# Patient Record
Sex: FEMALE
[De-identification: names, ages, dates, MRNs, and addresses within clinical notes are randomized per-mention and may not be internally consistent; named-entity substitution may affect disease eponyms.]

---

## 2022-11-16 ENCOUNTER — HOME CARE VISIT (OUTPATIENT)
Dept: SCHEDULING | Facility: HOME HEALTH | Age: 87
End: 2022-11-16

## 2022-11-16 ENCOUNTER — HOME HEALTH ADMISSION (OUTPATIENT)
Dept: HOME HEALTH SERVICES | Facility: HOME HEALTH | Age: 87
End: 2022-11-16
Payer: MEDICARE

## 2022-11-16 VITALS
SYSTOLIC BLOOD PRESSURE: 130 MMHG | OXYGEN SATURATION: 100 % | DIASTOLIC BLOOD PRESSURE: 80 MMHG | TEMPERATURE: 97.8 F | HEART RATE: 97 BPM | RESPIRATION RATE: 18 BRPM

## 2022-11-16 PROCEDURE — 0221000100 HH NO PAY CLAIM PROCEDURE

## 2022-11-16 PROCEDURE — G0299 HHS/HOSPICE OF RN EA 15 MIN: HCPCS

## 2022-11-16 ASSESSMENT — ENCOUNTER SYMPTOMS: DYSPNEA ACTIVITY LEVEL: AFTER AMBULATING 10 - 20 FT

## 2022-11-17 ENCOUNTER — HOME CARE VISIT (OUTPATIENT)
Dept: SCHEDULING | Facility: HOME HEALTH | Age: 87
End: 2022-11-17

## 2022-11-17 VITALS
SYSTOLIC BLOOD PRESSURE: 130 MMHG | OXYGEN SATURATION: 97 % | DIASTOLIC BLOOD PRESSURE: 80 MMHG | HEART RATE: 81 BPM | TEMPERATURE: 97.5 F | RESPIRATION RATE: 18 BRPM

## 2022-11-17 PROCEDURE — G0152 HHCP-SERV OF OT,EA 15 MIN: HCPCS

## 2022-11-17 PROCEDURE — G0151 HHCP-SERV OF PT,EA 15 MIN: HCPCS

## 2022-11-17 ASSESSMENT — ENCOUNTER SYMPTOMS: PAIN LOCATION - PAIN QUALITY: ACHE

## 2022-11-17 NOTE — HOME HEALTH
Pt is an 81 yo RHD female referred to PT with a history of falls. Pt lives in [address] and her room is filled with moving boxes. Pt states she had a UTI and was admitted to the hospital. After staying in rehab, she has returned home. She feels she is prescribed too many medicaitons. PMH: B SHERLEY, arthritis, falls. Pt presents with forward head, forward lumbar flexion with gait. Pt demonstrates BLE muscle weakness, impaired dynamic balance, limited standing tolerance and decreased endurance causing difficulty performing safe functional transfers from various surfaces throughout home, standing activities and bed mobility without assistance as able to do in PLOF. TUG is 25 which indicates a high fall risk. Pt is homebound due to being at high risk for falls and requires the assistance of another person to leave home safely. .    Patient will benefit from PT for strength, balance, transfer, gait training, Pt/CG education on fall prevention and HEP.

## 2022-11-17 NOTE — HOME HEALTH
This patient has answered NO to the following questions:   1) have you traveled outside the country   2) have you been exposed to or been in contact with anyone whom traveled outside the country in the past two weeks   3) do you have a sore throat/fever/dry cough      4)The patient has been educated on and demonstrates good understanding via the teach-back method for the following: Signs and symptoms of COVID-19 yes    Pt is an 80year old female who recently moved to detention. Pt referred for therapy after several falls, pt followed with rehab and returned to new detention . Pt previously had just moved to other detention recently which didn't work out. Pt has demonstrated decline in ADL's, transfers and was recently restricted to [facility] when entering rehab due to several falls. Pt ambulates in room and to dining room currently with walker with min assist for safety, VC for safety when turning for transfers and hand placement on surfaces. Pt requires min assist for ADL's with decreased standing balance/tolerance requiring UE support at all times and frequent rest periods. Pt demo's decreased UE strength for transfers and making sit to stand difficult from low chair. DME- shower chair, bars, raised toilet seat with arms, 4ww, standing walker. Pt is forgetful of recent events and of schedule/places/timing of events in new detention. Discussed DC plan  with pt and caregiver, plan to DC when goals met   Pt would benefit with OT to increase safety and indep with ADL's, increase bilat UE strength for functional transfers, adaptive equipment recommendations and training, increasing standing tolerance/balance for safety with ADL's with fewer rest periods, assist with orienting to new facility with prevention of falls.    Pt's goal is to walk more and be more indep

## 2022-11-18 VITALS
HEART RATE: 62 BPM | TEMPERATURE: 97.3 F | SYSTOLIC BLOOD PRESSURE: 132 MMHG | OXYGEN SATURATION: 99 % | DIASTOLIC BLOOD PRESSURE: 76 MMHG | RESPIRATION RATE: 18 BRPM

## 2022-11-21 ENCOUNTER — HOME CARE VISIT (OUTPATIENT)
Dept: SCHEDULING | Facility: HOME HEALTH | Age: 87
End: 2022-11-21

## 2022-11-21 ENCOUNTER — HOME CARE VISIT (OUTPATIENT)
Dept: HOME HEALTH SERVICES | Facility: HOME HEALTH | Age: 87
End: 2022-11-21

## 2022-11-21 PROCEDURE — G0157 HHC PT ASSISTANT EA 15: HCPCS

## 2022-11-22 ENCOUNTER — HOME CARE VISIT (OUTPATIENT)
Dept: SCHEDULING | Facility: HOME HEALTH | Age: 87
End: 2022-11-22

## 2022-11-22 VITALS
TEMPERATURE: 97.5 F | OXYGEN SATURATION: 100 % | DIASTOLIC BLOOD PRESSURE: 58 MMHG | HEART RATE: 58 BPM | SYSTOLIC BLOOD PRESSURE: 122 MMHG

## 2022-11-22 PROCEDURE — G0158 HHC OT ASSISTANT EA 15: HCPCS

## 2022-11-23 ENCOUNTER — HOME CARE VISIT (OUTPATIENT)
Dept: SCHEDULING | Facility: HOME HEALTH | Age: 87
End: 2022-11-23

## 2022-11-23 VITALS
HEART RATE: 60 BPM | DIASTOLIC BLOOD PRESSURE: 66 MMHG | SYSTOLIC BLOOD PRESSURE: 118 MMHG | OXYGEN SATURATION: 96 % | TEMPERATURE: 98 F | RESPIRATION RATE: 18 BRPM

## 2022-11-23 PROCEDURE — G0300 HHS/HOSPICE OF LPN EA 15 MIN: HCPCS

## 2022-11-23 PROCEDURE — G0157 HHC PT ASSISTANT EA 15: HCPCS

## 2022-11-23 ASSESSMENT — ENCOUNTER SYMPTOMS: HEMOPTYSIS: 0

## 2022-11-23 NOTE — HOME HEALTH
Pt agreeable to Tx . Seated in rocker in dark quiet room upon HOWE arrival and then again after Tx. Pt amb in room with 2 ww slowly , no loss of balance. Seated unsupported on director chair to complete (B) UE AROM , neck AROM , and dynaic sitting exercises . Provided wtih hand out to foolow along with exercises. NO c/o pain with exercises or transfers. Sit to stand difficult from appolstered chair, but with addition of throw pillow in seat, pt was able to easily transition sit to stand. Pt used good body mechanics without cues, Pt stated she had recieved a lot of therapy in the past and had learned the body mechanics. Pt slow ,moving and requiring time to process directions. .Provided with calendar and encouraged use to keep track of days and appointments. Encouraged hydration with infused water and juice provided during Tx. Pt stated she hates water , but could get used to infused water. Pt will advance toward goals and dc when goals met.

## 2022-11-24 NOTE — HOME HEALTH
SN assessment, vss, condition stable, respirations even and unlabored, lcta, no sob, no edema, good appetite and hydration, pt denies pain, no urinary discomfort reported, sn instructed pt on safety precautions, proper nutrition and hydration, take medications as prescribed, pt voiced understanding using the teach back method.

## 2022-11-25 ENCOUNTER — HOME CARE VISIT (OUTPATIENT)
Dept: SCHEDULING | Facility: HOME HEALTH | Age: 87
End: 2022-11-25

## 2022-11-25 VITALS
HEART RATE: 69 BPM | DIASTOLIC BLOOD PRESSURE: 70 MMHG | TEMPERATURE: 97.9 F | SYSTOLIC BLOOD PRESSURE: 142 MMHG | OXYGEN SATURATION: 97 %

## 2022-11-25 PROCEDURE — G0158 HHC OT ASSISTANT EA 15: HCPCS

## 2022-11-25 NOTE — HOME HEALTH
Pt agreeable to Tx with ADL . Seated on side of bed awaiting care giver to A with shower . Pt assited with set up of shower and hiar washing , but completed all bahting indep / mod I follwing set up , additional time required as Pt is slow moving and cautious. Transfers in and out of shower with use of rails and cues for hand plnacement . Seated on bed for indep dressisng with cues to advance to next task and to don undergarmernt properly . Pt stated she was unable to see line indicating back. Min A to don shoes /sandals. due to fatigue . Pt amb in room with walker to recliner and rested after ADL . Declined oral care at sink at this time . Pt will advance toward goals and d.c when met . Pt cooperative and pleasant . slow to process direction .

## 2022-11-27 VITALS — SYSTOLIC BLOOD PRESSURE: 132 MMHG | DIASTOLIC BLOOD PRESSURE: 78 MMHG

## 2022-11-27 VITALS — SYSTOLIC BLOOD PRESSURE: 120 MMHG | HEART RATE: 66 BPM | DIASTOLIC BLOOD PRESSURE: 68 MMHG

## 2022-11-27 NOTE — HOME HEALTH
Pt was walking towards her door with her RW when therpist arrived. Asked pt where she was gong and she said she had to go to dinner. Reminded her that dinner is at 5:30 and that she didnt have to go down this early. Pt walked back into her room and had her sit in her chair. She said she is still getting used to schedule and seems slighty confused when talking about her time at rehab and recent return. Pt reports having frequent urination and was inquired about being tested again for UTI. Spoke to [facility] nurse who will look to see if there is an order for UA. Initiated LE strength tx seated per HEP: ankle pumps,. LAQ, hip flex, resisted hip abd green tband , ham curls green tband 2x10 with instruction in proper form and pace. Sit tp stand from chair to RW; pt able to push off from arnrest nand keep wt forward to stand. Work on posture re-ed in standing , head turns and wt shjftimg. Gait with RW and SBA from living room to bathroom ; with cues on slow turn to sit on toilet. Discussed importance of using bathroom regularly to make sure she is clean and dry and to change pullup when wet as she is prone to UTIs. Also discussed proper hygiene. Walked back to her living room with RW and had her turn to sit in chair,. She has a 4ww in her room but has been instructed to continue using her RW until her safety, strength andn balance improves. After short rest contnued gait with RW and SBA from her room into hallway and down to dining room for dinner. Cues to correct posture and stay closer to walker , focus on step clearance. Education on importance of hydration, using walker at all times , elevate legs when in room and use bathroom regular to stay clean and dry.  With compliance to therapy and HEP she is expected to make gains

## 2022-11-28 ENCOUNTER — HOME CARE VISIT (OUTPATIENT)
Dept: SCHEDULING | Facility: HOME HEALTH | Age: 87
End: 2022-11-28

## 2022-11-28 VITALS
TEMPERATURE: 98.1 F | DIASTOLIC BLOOD PRESSURE: 82 MMHG | SYSTOLIC BLOOD PRESSURE: 122 MMHG | OXYGEN SATURATION: 98 % | HEART RATE: 69 BPM

## 2022-11-28 PROCEDURE — G0158 HHC OT ASSISTANT EA 15: HCPCS

## 2022-11-28 NOTE — HOME HEALTH
Pt was sitting on edge of her bed when therapist arrived. She seemed slightly confused about what she should be doing. Her aide came in to [facility] on her and encouraged her to not go back to bed. Had her walk out to her living room with her RW and sit in her chair. Worked on LE strength tx seated per HEP: ankle pumps,LAQ, hip flex, resisted hip abd and ham curls green tband 2x15. Pt required more cues today to stay on task and maintain form and was slow to react. Discussed returning to activities that she did before going to rehab such as sittercise. Reminded her that there is a calendar with activities and times and that she can have her aides walk with her. Sit to stand from low chair to RW with SBA. In standing worked on standing balance act with BUE assist. Gait with RW and SBA around her room, in and out of bathroom and turning to change direction. Cues on posture and staying closer to walker and on slow turn in front of chair. Moved some furniture over for her and instructed her to keep open path and floor free of clutter. She declined walkng outside of her room and wanted to rest. Had her sit in chair with legs elevated on ottoman which she is able to move herself. Also brought her a glass of water, reminding her importance of staying hydrated. Pt needs encouragement and reminders to drink water. Spoke with her aide after visit about her confusion. She said that she is usually better as the day goes on but is usually tired and more confused in mornings. She will check on patient and remnd her to drink her water. With compliance  to therapy and HEP she is expected to make gains  The patient has answered yes or no to the following questions:  Have you traveled outside of the country? NO  Have you been exposed to or been in conact with someone who traveled outside of the country inthe past two weeks? NO Do you have a fever, sore throat or dry cough?  No   The patient has been educated on nd demonstrates good understanding via the teach back method for the following: Signs and symptoms of Covid 19? YES  Patient instucted to alert nurse to any of these symptoms or any other changes.  Instructed to stay hydrated, proper handwashing which was instructed , social distance

## 2022-11-28 NOTE — HOME HEALTH
Pt agreeable toTx No reproted changes or incidents . Pt seated side of bed ,Unsupported to completed (B) UE Aroma nd dynamic sitting . Seated and standing in living room for resistance band exercises completed in multiple sets of 10 in 2 planes with no c/op pain . Pt transfers indep from bed and chair. used 2 ww consistnatly in room . Set up with clock on wall in kitchen for keeping track of time for meals and acitivities. Pt declined amb to activity room this morning , to reinforce where to go this afternoon. Reiforced use of [employer] acitity calendar and appointment calendar . Pt stated she remembered HOWE was coming this morning , however , she was in bed , so it is questionoable . Pt slow to process cues and slow to reply. Pepper monte from [facility]  WIll address with team . Pt will advance toward goals and d/c wehne met. Pt declined any ADL this tx.

## 2022-11-29 ENCOUNTER — HOME CARE VISIT (OUTPATIENT)
Dept: SCHEDULING | Facility: HOME HEALTH | Age: 87
End: 2022-11-29
Payer: MEDICARE

## 2022-11-29 PROCEDURE — G0157 HHC PT ASSISTANT EA 15: HCPCS

## 2022-11-30 ENCOUNTER — HOME CARE VISIT (OUTPATIENT)
Dept: SCHEDULING | Facility: HOME HEALTH | Age: 87
End: 2022-11-30

## 2022-11-30 VITALS
TEMPERATURE: 98.8 F | OXYGEN SATURATION: 96 % | SYSTOLIC BLOOD PRESSURE: 130 MMHG | HEART RATE: 70 BPM | DIASTOLIC BLOOD PRESSURE: 80 MMHG

## 2022-11-30 VITALS
SYSTOLIC BLOOD PRESSURE: 118 MMHG | HEART RATE: 68 BPM | DIASTOLIC BLOOD PRESSURE: 66 MMHG | TEMPERATURE: 97.8 F | OXYGEN SATURATION: 96 % | RESPIRATION RATE: 18 BRPM

## 2022-11-30 PROCEDURE — G0158 HHC OT ASSISTANT EA 15: HCPCS

## 2022-11-30 PROCEDURE — G0300 HHS/HOSPICE OF LPN EA 15 MIN: HCPCS

## 2022-11-30 ASSESSMENT — ENCOUNTER SYMPTOMS
HEMOPTYSIS: 0
PAIN LOCATION - PAIN QUALITY: ACHE

## 2022-11-30 NOTE — HOME HEALTH
Pt in bed asleep upon HOWE arrival . Pt c/o bad head ache and running nose. Pt also reported feeling symptoms of UTI . Pt agreeabla to sit up for vital signs to me taken . Med tech notified of slighlty elevated temp , and symptoms . Pt requesting Tylenol . Med tech awaiting orders. Pt amb to bathroom slowly with 2 ww for toielting with min A to remove and jean undergarment. Incontinence of uring in undergarment and on the way to toilet , on floor. Pt asssited with hygiene for thorough cleansing. Pt then returned to bed , declined any further ADL or exercises. Pt completed supine to sit , sit to stand ,and sit to supine indep . Pt will advance toward goals and Dc when met. Pt provided [facility] new calendar for new month and 2023 calendar as well . However Pt did not use calendar today to recall OT visit.

## 2022-12-01 NOTE — HOME HEALTH
SN assessment, vss, condition stable, pt is alert and oriented x3, forgetful, pleasant and cooperative, good appetite and hydration, pt started Macrobid 100mg q 12h x5d on 11/29/22, no s/e reported, pt denies pain, no urinary discomfort at this time, sn instructed pt/cg to increase fluid intake, including cranberry juice or drinks fortified with Vit C to acidify the urine, report discomfort when urinating, chills, flank pain, pt/cg voiced understanding using the teach back method.

## 2022-12-05 ENCOUNTER — HOME CARE VISIT (OUTPATIENT)
Dept: SCHEDULING | Facility: HOME HEALTH | Age: 87
End: 2022-12-05
Payer: MEDICARE

## 2022-12-05 VITALS — DIASTOLIC BLOOD PRESSURE: 70 MMHG | SYSTOLIC BLOOD PRESSURE: 128 MMHG | HEART RATE: 66 BPM

## 2022-12-05 VITALS
TEMPERATURE: 98.1 F | OXYGEN SATURATION: 98 % | SYSTOLIC BLOOD PRESSURE: 90 MMHG | HEART RATE: 72 BPM | DIASTOLIC BLOOD PRESSURE: 60 MMHG

## 2022-12-05 PROCEDURE — G0158 HHC OT ASSISTANT EA 15: HCPCS

## 2022-12-05 NOTE — HOME HEALTH
Pt Positive for COVID . Sitting in recliner in room , Declined exercises , but agreeabl to try amb in room for exercise. Pt has low bp and light headed with stand  with walker , choosing to sit back down . Educated on need to keep hydrated and keep moving for strenagthening. Pt agreeable to water and juice. Provided along with ice for use with both , and soda. Pt did not check calendar today , so was not \"prepared \" for OT . REinforced use of appointment calandat , at the least , at this time , as faclility acitities of limits due to isolation . Pt will remain safe and progress toward goals .  Dc when goals met

## 2022-12-06 ENCOUNTER — HOME CARE VISIT (OUTPATIENT)
Dept: SCHEDULING | Facility: HOME HEALTH | Age: 87
End: 2022-12-06
Payer: MEDICARE

## 2022-12-06 PROCEDURE — G0157 HHC PT ASSISTANT EA 15: HCPCS

## 2022-12-06 NOTE — HOME HEALTH
Pt was sittting up in her chair in her living room when therapist arrived,, She is pleasant and willing to participate. Worked on LE strength tx seated per HEP with cueing on proper form and pace. Pt is slightly distracted and required cue to stay on task and maintain form. Her answers were slow and she seemed confused at times. Had pt drink some water while seated and reminded her of impoirtance of staying hydrated. She has been treated for recurring UTI; . Sit to stand fro chait to RW [facility] for safety; She rememebered to push off from armrest of chair and keep wt forward. Worked on standing balannce act at [employer] with BUE assist: perturbations. postre re-ed with head turns, wt shifting. Gait tx with RW and SBA from living room to bathroom. Observed pt walk in and start to turn then back up to toilet. She turned towards sink and thrn backed up to sit. Once in sitting showed her how she could walk in and towards shower, then turn slow and back up to sit on toilet and not have to take several steps back to sit. Explained that this way would be safer and she said she would try to remember. Had pt use bathroon before walking down to lunch and to check to see if she was dry which she was. OInce she was done. stood and then walked to her front door and out in hallway. Continued walk with 4ww and SBA from room to dining rooom where she stayed for lunch. Cues along the way to correct posture and focus on step clearamce.  She is slowly making gains and wit compliance to therapy and HEP is expected to make furher gains

## 2022-12-07 ENCOUNTER — HOME CARE VISIT (OUTPATIENT)
Dept: SCHEDULING | Facility: HOME HEALTH | Age: 87
End: 2022-12-07
Payer: MEDICARE

## 2022-12-07 VITALS
RESPIRATION RATE: 18 BRPM | OXYGEN SATURATION: 98 % | HEART RATE: 72 BPM | SYSTOLIC BLOOD PRESSURE: 120 MMHG | DIASTOLIC BLOOD PRESSURE: 62 MMHG | TEMPERATURE: 97.7 F

## 2022-12-07 PROCEDURE — G0299 HHS/HOSPICE OF RN EA 15 MIN: HCPCS

## 2022-12-07 ASSESSMENT — ENCOUNTER SYMPTOMS: DYSPNEA ACTIVITY LEVEL: AFTER AMBULATING MORE THAN 20 FT

## 2022-12-08 ENCOUNTER — HOME CARE VISIT (OUTPATIENT)
Dept: HOME HEALTH SERVICES | Facility: HOME HEALTH | Age: 87
End: 2022-12-08
Payer: MEDICARE

## 2022-12-08 ENCOUNTER — HOME CARE VISIT (OUTPATIENT)
Dept: SCHEDULING | Facility: HOME HEALTH | Age: 87
End: 2022-12-08
Payer: MEDICARE

## 2022-12-08 PROCEDURE — G0157 HHC PT ASSISTANT EA 15: HCPCS

## 2022-12-08 NOTE — HOME HEALTH
Patient is AOx3, forgetful at times, pleasant, in no apparent distress upon arrival and agreeable to sn discipline home visit. Referral received for wound care to RUE and BLE. All wounds are healed and skin is intact. Patient states appetite is good and hydration is adequate. Patient denies having any pain. Medications reviewed. Home safety check completed. Patient denies having any paranoia or hallucinations at this time, concentration is appropriate. Patient benefited from [facility] services for disease process and management, med management, infection control, home safety, education. Patient is homebound due to weakness, impaired functional mobility, risk for fall, risk for infection, patient requires use of assistive device and patient requires assistance of another person to leave home safely. SN educated patient on disease process and management, home safety, using assistive device at all times, fall precautions, s/s of infection, taking medications as prescribed, call me first procedure, s/s to report to nurse, physician, and s/s that necessitate calling emergency personnel. Patient repeated back and verbalized understanding, all questions answered. Pts wounds hea;ed. Pt will continue with therapy. SN discipline from [facility] today. SN instructed pt to follow up with MD for any issues and pt verbalized understanding. Pt tested positive for covid 19 this week and denies any symptoms except fatigue. SN instructed pt to stay home and separate from others as much as possible, use separate bathroom, don't share personal household items like cups, towels, utensils, get plenty of rest, stay hydrated and eat well and take all medications as prescribed.

## 2022-12-11 VITALS — HEART RATE: 66 BPM | SYSTOLIC BLOOD PRESSURE: 112 MMHG | DIASTOLIC BLOOD PRESSURE: 70 MMHG

## 2022-12-12 ENCOUNTER — HOME CARE VISIT (OUTPATIENT)
Dept: SCHEDULING | Facility: HOME HEALTH | Age: 87
End: 2022-12-12
Payer: MEDICARE

## 2022-12-12 VITALS
SYSTOLIC BLOOD PRESSURE: 115 MMHG | TEMPERATURE: 98.5 F | OXYGEN SATURATION: 98 % | DIASTOLIC BLOOD PRESSURE: 82 MMHG | HEART RATE: 66 BPM

## 2022-12-12 PROCEDURE — G0151 HHCP-SERV OF PT,EA 15 MIN: HCPCS

## 2022-12-12 PROCEDURE — G0158 HHC OT ASSISTANT EA 15: HCPCS

## 2022-12-12 ASSESSMENT — ENCOUNTER SYMPTOMS: PAIN LOCATION - PAIN QUALITY: ACHE

## 2022-12-12 NOTE — HOME HEALTH
Pt agreeable to Tx JUst completed Tx with PT. No med  changes reproted by med tech, no incidents. Pt reprots feeling better , having had no diarrhea episodes today. Declined ADL's stating , that she did what she felt like already and since in [place] , she didn't feel anything more was required. Agreeable to UE exercise but then reported (L) neck and shoulder pain with AROM . TOlerated all exercises in reps of 10 though. Instructed in pacing during Ther ex and use of proper body mechanics for effective strengthening . Resistnace with yellow band x 10 in multiple planes in unsupported sitting . AMb and transfers in room indep Pt will be seen by OTR next tx . Pt stated she would like to continue with Upper body exercicses since she had several days of inactivity due to COVID> Pt still COVID possitive today . Pt will be Dc when goals met.

## 2022-12-12 NOTE — HOME HEALTH
Pt is COVID positive and was unable to sign computer for visit. Pt was progressing well with the POC but has regressed secondary to jennifer COVID-19. Pt states she has had decreased energy and has had bouts of diarrhea. Pt demonstrates BLE muscle weakness, impaired dynamic balance, limited standing tolerance and decreased endurance causing difficulty performing safe functional transfers from various surfaces throughout home, standing activities and bed mobility without assistance as able to do in PLOF. TUG is 23 which indicates a high fall risk. Pt is homebound due to being at high risk for falls and requires the assistance of another person to leave home safely.

## 2022-12-12 NOTE — HOME HEALTH
Pt sitting up in her chair when therapist arrived, She is not wearing pants but has a pullup on. She is positive for Covid and has to stay in her room in isolation. Pt said she has had excessive diarrhea, often \"explosive\" today and yesterday. She was upset that she didnt make it to bathroom and they had to clean her carpet. Explained that this is one of the symptoms associated with Covid and she should try to stay hydrated as well as eat bland foods which may not be as upsetting to her stomach. She seemed more clear in her thoughts and not as confused as last week. She agreed to participate in therapy as much as she can tolerate. Worked on light seated therex BLE per HEP 2x10. she was able to follow with min cues to maintain form and slow pace. Went over proper breathing tech as well; pt is not complaining of shortness of breath. Sit to stand from low chair to RW with CGA for safety. She is slow but able to push of fto stand. Reminded her to wait when first standing to make sure she is not dizzy. Gait with RW and CGA from her livingroom to bedroom, turned to sit on bed then continued back around her apt one lap and to her chair to rest. She was able tp walk slow without complaint. Encouraged her to get up and walk at least  every hour if she is feeling well enough to. Reminded her of imp[ortance of staying hydrated as she is prone to UTIs and dehydration. She has water next to her chair. Also reminded her to keep her pendant on and call aide for any assistance she may need. She is slowly making gains due to Covid. As she gets well and with compliance she is expected to make fufther gains  The patient has answered yes or no to the following questions:  Have you traveled outside of the country? NO  Have you been exposed to or been in conact with someone who traveled outside of the country inthe past two weeks? NO Do you have a fever, sore throat or dry cough?  No -  But has diarrhea-  POSITIVE COVID TEST  The patient has been educated on nd demonstrates good understanding via the teach back method for the following: Signs and symptoms of Covid 19? YES  Patient instucted to alert nurse to any of these symptoms or any other changes.  Instructed to stay hydrated, proper handwashing which was instructed , social distance

## 2022-12-15 VITALS — SYSTOLIC BLOOD PRESSURE: 122 MMHG | OXYGEN SATURATION: 97 % | HEART RATE: 70 BPM | DIASTOLIC BLOOD PRESSURE: 68 MMHG

## 2022-12-15 NOTE — HOME HEALTH
Pt was sitting up in her chair in her room. She is still in isolation due to being covod positive. Pt is feelong better,seems less confused but said she is still having loose stools. Reviewed and performed LE strength tx seated per HEP 2x15. She was able to follow properly with min cues to slow pace. Pt was able to stay on task today. Sit to stand from chair to RW with SBA. Pt able to scoot forward in chair and push off from armrest to stand. She is slow to stand but was able to on first try. Worked on Standing therex : heel raises and minimarching x`10 and balance act with BUE assist: posture re-ed with head turns, perturbations and fwd step and back. Gait with RW and SBA 3 laps in her apt from living room to bedroom and back. She had improved ambulation tolerance and was able to ambulate continuously. Remnded pt that although she has to stay in her room she can stil work on her endurance and gait distance by getting up to take walks in her room, trying to increase her time or laps. While seated also reminded her the imprortance of staying hydrated, especially if having loose stools. Instructed to continue elevating her legs when seated, drink more water, perform HEP daily and get up to change positions often for pressure relief and also to take walks. She was more alert today and reported back good understanding of all instruction. She is to be reassessed by PT next visit with recommendation to continue strength and balance tx to improve transfers and ambulation. The patient has answered yes or no to the following questions:  Have you traveled outside of the country? NO  Have you been exposed to or been in conact with someone who traveled outside of the country inthe past two weeks? NO Do you have a fever, sore throat or dry cough?  No -  Has diarrhea;  is COVID positive   The patient has been educated on nd demonstrates good understanding via the teach back method for the following: Signs and symptoms of Covid 19? YES  Patient instucted to alert nurse to any of these symptoms or any other changes.  Instructed to stay hydrated, proper handwashing which was instructed , social distance

## 2022-12-16 ENCOUNTER — HOME CARE VISIT (OUTPATIENT)
Dept: HOME HEALTH SERVICES | Facility: HOME HEALTH | Age: 87
End: 2022-12-16
Payer: MEDICARE

## 2022-12-16 VITALS
TEMPERATURE: 97.8 F | SYSTOLIC BLOOD PRESSURE: 128 MMHG | HEART RATE: 76 BPM | OXYGEN SATURATION: 96 % | RESPIRATION RATE: 19 BRPM | DIASTOLIC BLOOD PRESSURE: 78 MMHG

## 2022-12-21 ENCOUNTER — HOME CARE VISIT (OUTPATIENT)
Dept: SCHEDULING | Facility: HOME HEALTH | Age: 87
End: 2022-12-21
Payer: MEDICARE

## 2022-12-21 VITALS
DIASTOLIC BLOOD PRESSURE: 60 MMHG | SYSTOLIC BLOOD PRESSURE: 109 MMHG | RESPIRATION RATE: 18 BRPM | HEART RATE: 67 BPM | OXYGEN SATURATION: 97 % | TEMPERATURE: 97.1 F

## 2022-12-21 PROCEDURE — G0152 HHCP-SERV OF OT,EA 15 MIN: HCPCS

## 2022-12-21 ASSESSMENT — ENCOUNTER SYMPTOMS: PAIN LOCATION - PAIN QUALITY: ACHE

## 2022-12-21 NOTE — HOME HEALTH
This patient has answered NO to the following questions:   1) have you traveled outside the country   2) have you been exposed to or been in contact with anyone whom traveled outside the country in the past two weeks   3) do you have a sore throat/fever/dry cough      4)The patient has been educated on and demonstrates good understanding via the teach-back method for the following: Signs and symptoms of COVID-19 yes    No reported changes or incidents since previous visit  Pt seen for OT DC today. Pt insructed in bilat UE strenghening, posture exercises, core strengthening and trunk control to assist with ADL's, transfers. Instructed pt in energy conservation , body mechanics, work simplification to reduce fatigue and risk for falls with ADLs. Pt has met OT goals and will continue with HEP and facility activities for continued strengthening.

## 2022-12-22 ENCOUNTER — HOME CARE VISIT (OUTPATIENT)
Dept: SCHEDULING | Facility: HOME HEALTH | Age: 87
End: 2022-12-22
Payer: MEDICARE

## 2022-12-22 PROCEDURE — G0157 HHC PT ASSISTANT EA 15: HCPCS

## 2022-12-26 VITALS — SYSTOLIC BLOOD PRESSURE: 118 MMHG | DIASTOLIC BLOOD PRESSURE: 60 MMHG | HEART RATE: 67 BPM | OXYGEN SATURATION: 96 %

## 2022-12-28 ENCOUNTER — HOME CARE VISIT (OUTPATIENT)
Dept: SCHEDULING | Facility: HOME HEALTH | Age: 87
End: 2022-12-28
Payer: MEDICARE

## 2022-12-28 PROCEDURE — G0151 HHCP-SERV OF PT,EA 15 MIN: HCPCS

## 2022-12-31 ENCOUNTER — HOME CARE VISIT (OUTPATIENT)
Dept: HOME HEALTH SERVICES | Facility: HOME HEALTH | Age: 87
End: 2022-12-31
Payer: MEDICARE

## 2023-01-01 VITALS — HEART RATE: 70 BPM | SYSTOLIC BLOOD PRESSURE: 122 MMHG | OXYGEN SATURATION: 97 % | DIASTOLIC BLOOD PRESSURE: 72 MMHG

## 2023-01-01 NOTE — HOME HEALTH
Greeted pt sitting up in her chair in her room when therapisr arrived, she is pleasant and willing to participate. Pt said she got some news over holidays that one of her sons had passed away after an illness. She hadnt spoken to him in some time as he was out of country, but that she found out from her younger son. Asked pt if she needed someone to talk to or help with anything and she said she was doing ok. She will speak to sister briseyda once she returns from vacation but overall she was handling the news ok. She has been staying in her room mostly, besides meals, but said she is going to try and come out more. Worked on LE strength tx seated to warmup then standing ex at  : heel raises, marching, hip abd, ham curls. She has improved tolerance to continuous standing without complaint. Standing balance act at  without UE assist: perturbations, head turns, eyes open/closed, tandem stance with head turns. She is challenged with head turns and modified tandem stance but no loss of balance. Pt is using RW currently and feels comfortable. Told her we will practice with 4ww next visit as long as she is still feeling good. Gait with RW around her apt including turning to change direction and to sit on bed and in chair. Gait with RW and SBA from rpoom to dining room with some reminders to correct posture and stand closer to walker. One at table worked on pulling chair out ,turning to sit in chair then try to scoot chair in. Chairs are heavy so she has to stand slightly and pull chair over. Overall she is progressing in all areas and with continued compliance to therapy and HEP is expected to make further gains. Will continue to monitor her mood since death of her son and see if she needs any assistance.

## 2023-01-03 ENCOUNTER — HOME CARE VISIT (OUTPATIENT)
Dept: SCHEDULING | Facility: HOME HEALTH | Age: 88
End: 2023-01-03
Payer: MEDICARE

## 2023-01-03 PROCEDURE — G0157 HHC PT ASSISTANT EA 15: HCPCS

## 2023-01-05 ENCOUNTER — HOME CARE VISIT (OUTPATIENT)
Dept: SCHEDULING | Facility: HOME HEALTH | Age: 88
End: 2023-01-05
Payer: MEDICARE

## 2023-01-05 VITALS
OXYGEN SATURATION: 97 % | DIASTOLIC BLOOD PRESSURE: 62 MMHG | RESPIRATION RATE: 17 BRPM | HEART RATE: 65 BPM | SYSTOLIC BLOOD PRESSURE: 112 MMHG

## 2023-01-05 PROCEDURE — G0151 HHCP-SERV OF PT,EA 15 MIN: HCPCS

## 2023-01-05 ASSESSMENT — ENCOUNTER SYMPTOMS: PAIN LOCATION - PAIN QUALITY: ACHE

## 2023-01-05 NOTE — HOME HEALTH
Patient DC from [facility] services with all goals met. Patient participated in therapy visits where patient demonstrated improvement with strength, balance, ROM, pain control, fall prevention strategies, and functional mobility including bed mobility, transfers, gait, and stair navigation. Patient/CG agree to DC. Patient has current/updated medication list present in home. Referring Md notified of d/c. A follow up PCP appointment was made by PT for 1/12/23 @ 3:40 and [facility] nurse was informed for transportation arrival.  Pt is I with ADLs, mod I/CGA with showers and ambulates throughout facility mod I with 2ww. Pt states she still does not like using 4ww rollator walker but does not use her WC.

## 2023-01-08 VITALS — DIASTOLIC BLOOD PRESSURE: 70 MMHG | HEART RATE: 76 BPM | SYSTOLIC BLOOD PRESSURE: 120 MMHG | OXYGEN SATURATION: 97 %

## 2023-01-09 NOTE — HOME HEALTH
Greeted pt sitting up in her rocker when therapist arrived, she is pleasant and willing to participate. Asked pt how she is coping since hearing of the death of her son and she said she was doing ok. She was able to talk with sister Margene Brunner and felt good having people praying. Reviewed LE strength tx seated and in standing per HEP. She is able to follow and has had np complaint of pain or fatigue. She is startig to attend some activiites but has not tried sittercise yet. Encouraged her to attend daily in addition to performing HEP in room. Brought 4ww from her bedroom out to practice with. She is currently using RW and feels comfortable with. Before her last fall her son had purchased 4ww for her but she only got to try it once or twice, She was willing to practice again to see how it felt. Went over use of 4ww including locking and unlocking brakes and how to squeeze brakes to slow walker down. Gait tx with 4ww and CGA form her room into hallway. Walked to dining room and then turned and walked one lap around her neighborhood. Continue back to her dining room, turned and then back to her room. Cues along the way on posture, staying closer to walkr and squeezing brakes to slow joi and allow her to catch up to walker. Practiced sititng on walker seat one time with cues to also park walker against wall before sitting. She was able to open her door on her own and maneuver walker into room. She did well but states she feels most comfortable at this time with RW. Talked to pt as well as her aide about practicing with 4ww to and from her dining room if she feels comfortable. Suggest she have her aide walk with her if she wants to start trying 4ww again. Reminders to stay hydrated , use bathroom regularly to stay clean and dry and elevate legs for edema control. Will follow up again with [facility] nurse about a follow up to her PCP. Pt is to be DC by physical therapist as goals have been met.

## 2023-07-21 ENCOUNTER — HOME HEALTH ADMISSION (OUTPATIENT)
Dept: HOME HEALTH SERVICES | Facility: HOME HEALTH | Age: 88
End: 2023-07-21
Payer: MEDICARE

## 2023-07-22 ENCOUNTER — HOME CARE VISIT (OUTPATIENT)
Dept: SCHEDULING | Facility: HOME HEALTH | Age: 88
End: 2023-07-22
Payer: MEDICARE

## 2023-07-22 VITALS
SYSTOLIC BLOOD PRESSURE: 142 MMHG | RESPIRATION RATE: 18 BRPM | TEMPERATURE: 96.9 F | DIASTOLIC BLOOD PRESSURE: 72 MMHG | HEART RATE: 60 BPM | OXYGEN SATURATION: 97 %

## 2023-07-22 PROCEDURE — G0299 HHS/HOSPICE OF RN EA 15 MIN: HCPCS

## 2023-07-22 ASSESSMENT — ENCOUNTER SYMPTOMS
PAIN LOCATION - PAIN QUALITY: ACHY
DYSPNEA ACTIVITY LEVEL: AFTER AMBULATING 10 - 20 FT

## 2023-07-27 ENCOUNTER — HOME CARE VISIT (OUTPATIENT)
Dept: SCHEDULING | Facility: HOME HEALTH | Age: 88
End: 2023-07-27
Payer: MEDICARE

## 2023-07-27 PROCEDURE — G0300 HHS/HOSPICE OF LPN EA 15 MIN: HCPCS

## 2023-07-29 VITALS
OXYGEN SATURATION: 99 % | TEMPERATURE: 97.9 F | SYSTOLIC BLOOD PRESSURE: 138 MMHG | HEART RATE: 60 BPM | DIASTOLIC BLOOD PRESSURE: 64 MMHG | RESPIRATION RATE: 18 BRPM

## 2023-07-29 ASSESSMENT — ENCOUNTER SYMPTOMS: HEMOPTYSIS: 0

## 2023-07-29 NOTE — HOME HEALTH
SN assessment, pt monitored for HTN, vss, condition stable,  pt is A&O x 3, forgetful, pleasant and cooperative, respirations even and unlabored, lcta,  no sob, no edema, no chest pain, ambulates with walker with slow, steady gait, no recent falls reported, good appetite, fair hydration, no constipation, no urinary difficulty, no s/e from anticoagulant therapy, no c/o pain at this time, no distress noted, sn instructed pt on safety precautions, use assistive device at all times, proper nutrition, increase fluid intake, take medications as prescribed, pt voiced understanding using the teach back method.

## 2023-07-31 ENCOUNTER — HOME CARE VISIT (OUTPATIENT)
Dept: SCHEDULING | Facility: HOME HEALTH | Age: 88
End: 2023-07-31
Payer: MEDICARE

## 2023-07-31 VITALS
DIASTOLIC BLOOD PRESSURE: 62 MMHG | HEART RATE: 60 BPM | OXYGEN SATURATION: 96 % | RESPIRATION RATE: 18 BRPM | SYSTOLIC BLOOD PRESSURE: 138 MMHG | TEMPERATURE: 98 F

## 2023-07-31 PROCEDURE — G0300 HHS/HOSPICE OF LPN EA 15 MIN: HCPCS

## 2023-07-31 ASSESSMENT — ENCOUNTER SYMPTOMS
PAIN LOCATION - PAIN QUALITY: ACHY
HEMOPTYSIS: 0

## 2023-07-31 NOTE — HOME HEALTH
SN assessment, vss, condition stable, pt is A&O x3, pleasant and cooperative, b/p138/62, no dizziness, no chest pain, no congestion, lcta, right foot 2+ lilia edema, c/o 7/10 bilateral feet pain, takes tylenol hs with good results, appointment with vascular specialist for u/s of BLE on 8/1023, ambulates with walker with slow, steady gait, no recent falls reported, sn instructed pt on safety precautions, proper nutrition with limited sodium intake and good hydration, take medications as prescribed, pt  voiced understanding using the teach back method.

## 2023-08-04 ENCOUNTER — HOME CARE VISIT (OUTPATIENT)
Dept: HOME HEALTH SERVICES | Facility: HOME HEALTH | Age: 88
End: 2023-08-04
Payer: MEDICARE